# Patient Record
Sex: MALE | Race: WHITE | NOT HISPANIC OR LATINO | Employment: UNEMPLOYED | URBAN - METROPOLITAN AREA
[De-identification: names, ages, dates, MRNs, and addresses within clinical notes are randomized per-mention and may not be internally consistent; named-entity substitution may affect disease eponyms.]

---

## 2017-05-23 ENCOUNTER — HOSPITAL ENCOUNTER (OUTPATIENT)
Dept: PSYCHIATRY | Facility: HOSPITAL | Age: 47
Discharge: HOME | End: 2017-05-23

## 2017-05-23 PROCEDURE — 90791 PSYCH DIAGNOSTIC EVALUATION: CPT | Performed by: SOCIAL WORKER

## 2017-05-24 ENCOUNTER — OFFICE VISIT (OUTPATIENT)
Dept: PSYCHIATRY | Facility: HOSPITAL | Age: 47
End: 2017-05-24

## 2017-05-24 DIAGNOSIS — F10.20 ALCOHOL USE DISORDER, SEVERE, DEPENDENCE (HCC): Primary | ICD-10-CM

## 2017-05-30 ENCOUNTER — OFFICE VISIT (OUTPATIENT)
Dept: PSYCHIATRY | Facility: HOSPITAL | Age: 47
End: 2017-05-30

## 2017-05-30 DIAGNOSIS — F10.20 ALCOHOL USE DISORDER, SEVERE, DEPENDENCE (HCC): Primary | ICD-10-CM

## 2017-06-01 ENCOUNTER — OFFICE VISIT (OUTPATIENT)
Dept: PSYCHIATRY | Facility: HOSPITAL | Age: 47
End: 2017-06-01

## 2017-06-01 DIAGNOSIS — F10.20 ALCOHOL USE DISORDER, SEVERE, DEPENDENCE (HCC): Primary | ICD-10-CM

## 2017-06-01 NOTE — PROGRESS NOTES
CD IOP Group note  Teaching Record: 11:00am-12:00pm  Information / activity: Interactive       Good participation      Jose Reyes LCSW            Observations: 8:30am-10:00am    Engaged in Activity / Process and Self -disclosed: Yes  Applies Topic to self: Yes  Able to give Constructive Feedback: Yes  Affect: anxious  Degree of Insightful Thinking 5  Notes:  Pt actively participated in group process. Pt has difficulty taking responsibility of recovery. Pt struggles with identifying and managing feelings.       Jose Reyes LCSW

## 2017-06-01 NOTE — PROGRESS NOTES
Teaching Record:  Information / activity:  Expressive Therapy    Box Self Collage    Good participation      Mio Johnson, LPAT

## 2017-06-01 NOTE — PLAN OF CARE
Problem:  Patient Care Overview (Adult)  Goal: Interdisciplinary Rounds/Family Conference  Outcome: Ongoing (interventions implemented as appropriate)  Pt reported sober date: 4/4/17  Pt attended 3-5 weekly 12-step meetings  Obtaining phone numbers and reaching out to build sober support  Pt obtained sponsor  Compliant on medication. Pt reports decreasing medication through psychiatrist, Dr Palmer.   Stressors:  relationships  Pt has minimal motivation.     06/01/17 1340   Interdisciplinary Rounds/Family Conf   Participants social work;pastoral care

## 2017-06-02 ENCOUNTER — OFFICE VISIT (OUTPATIENT)
Dept: PSYCHIATRY | Facility: HOSPITAL | Age: 47
End: 2017-06-02

## 2017-06-02 DIAGNOSIS — F10.20 ALCOHOL USE DISORDER, SEVERE, DEPENDENCE (HCC): Primary | ICD-10-CM

## 2017-06-02 NOTE — PROGRESS NOTES
CD IOP Group note  Teaching Record: 11:00am-12:00pm  Information / activity: Matrix   Early Recovery Skills Workbook    Good participation      Jose Reyes LCSW          Teaching Record: 10:00am-11:00am  Information / activity: Lecture   Relapse Prevention     Good participation      Jose Reyes LCSW            Observations: 8:30am-10:00am    Engaged in Activity / Process and Self -disclosed: Yes  Applies Topic to self: Yes  Able to give Constructive Feedback: Yes  Affect: anxious  Degree of Insightful Thinking 6  Notes:  Pt actively participated in group process. Pt has difficulty focusing on here and now. Pt often uses unrealistic scenarios when discussing problems and seeking solutions.       Jose Reyes LCSW

## 2017-06-05 ENCOUNTER — OFFICE VISIT (OUTPATIENT)
Dept: PSYCHIATRY | Facility: HOSPITAL | Age: 47
End: 2017-06-05

## 2017-06-05 DIAGNOSIS — F10.20 ALCOHOL USE DISORDER, SEVERE, DEPENDENCE (HCC): Primary | ICD-10-CM

## 2017-06-05 NOTE — PROGRESS NOTES
CD IOP Group note  Observations: 8:30am-10:00am and 11:30am-12:00PM    Engaged in Activity / Process and Self -disclosed: Yes  Applies Topic to self: Yes  Able to give Constructive Feedback: Yes  Affect: anxious  Degree of Insightful Thinking 6  Notes:  Pt actively participated in group process. Pt able to identify essentials to recovery. Pt reports being able to have fun over the weekend which is a shift in perspective. Pt reports that he continues to monitor medication.       SANTA HerndonW

## 2017-06-05 NOTE — PROGRESS NOTES
Teaching Record:  Information / activity:  Spirituality  Kai Vargas and Vulnerability    Good participation      Laz Noriega

## 2017-06-06 ENCOUNTER — OFFICE VISIT (OUTPATIENT)
Dept: PSYCHIATRY | Facility: HOSPITAL | Age: 47
End: 2017-06-06

## 2017-06-06 DIAGNOSIS — F10.20 ALCOHOL USE DISORDER, SEVERE, DEPENDENCE (HCC): Primary | ICD-10-CM

## 2017-06-07 ENCOUNTER — OFFICE VISIT (OUTPATIENT)
Dept: PSYCHIATRY | Facility: HOSPITAL | Age: 47
End: 2017-06-07

## 2017-06-07 DIAGNOSIS — F10.20 ALCOHOL USE DISORDER, SEVERE, DEPENDENCE (HCC): Primary | ICD-10-CM

## 2017-06-07 NOTE — PROGRESS NOTES
CD IOP Group note  Teaching Record: 11:00am-12:00pm  Information / activity: Video   Addiction in the Family / Dulce Black    Good participation      Jose Reyes LCSW          Teaching Record: 10:00am-11:00am  Information / activity: Lecture   Addiction and the brain     Good participation      Jose Reyes LCSW            Observations: 8:30am-10:00am    Engaged in Activity / Process and Self -disclosed: Yes  Applies Topic to self: Yes  Able to give Constructive Feedback: Yes  Affect: flat  Degree of Insightful Thinking 6  Notes:  Pt actively participated in group process. Pt able to identify codependency in relationship with wife. Pt verbalized need for forgiveness.       Jose Reyes LCSW

## 2017-06-08 ENCOUNTER — OFFICE VISIT (OUTPATIENT)
Dept: PSYCHIATRY | Facility: HOSPITAL | Age: 47
End: 2017-06-08

## 2017-06-08 DIAGNOSIS — F10.20 ALCOHOL USE DISORDER, SEVERE, DEPENDENCE (HCC): Primary | ICD-10-CM

## 2017-06-08 NOTE — PROGRESS NOTES
Teaching Record:  Information / activity:  Expressive Therapy    Therapeutic story [employer] [place] [employer] [place] with watercolor     Good participation      Mio Johnson LPAT

## 2017-06-08 NOTE — PROGRESS NOTES
CD IOP Group note  Teaching Record: 11:00am-12:00pm  Information / activity: Interactive Activity       Good participation      Jose Reyes LCSW            Observations: 8:30am-10:00am    Engaged in Activity / Process and Self -disclosed: Yes  Applies Topic to self: Yes  Able to give Constructive Feedback: Yes  Affect: anxious  Degree of Insightful Thinking 6  Notes:  Pt activley participated in group process. Pt reports that sponsor wants him to read more. Pt able to identify need for healthy communication with spouse.       Jose Reyes LCSW

## 2017-06-08 NOTE — PLAN OF CARE
Problem: [facility] Patient Care Overview (Adult)  Goal: Interdisciplinary Rounds/Family Conference  Outcome: Ongoing (interventions implemented as appropriate)  Pt reported sober date: 4/9/17  Pt attended 3-5 weekly 12-step meetings  Obtaining phone numbers and reaching out to build sober support  Pt obtained sponsor  Compliant on medication (Adderall and Xanax) Pt reports continued decreased medication with psychiatrist consent.    Stressors: financial and relationships    06/08/17 1304   Interdisciplinary Rounds/Family Conf   Participants social work;pastoral care

## 2017-06-12 ENCOUNTER — OFFICE VISIT (OUTPATIENT)
Dept: PSYCHIATRY | Facility: HOSPITAL | Age: 47
End: 2017-06-12

## 2017-06-12 DIAGNOSIS — F10.20 ALCOHOL USE DISORDER, SEVERE, DEPENDENCE (HCC): Primary | ICD-10-CM

## 2017-06-12 LAB
AMPHET CTO UR CFM-MCNC: >4000 NG/ML
AMPHET+METHAMPHET UR QL: POSITIVE
AMPHETAMINES UR QL SCN: NORMAL NG/ML
AMPHETAMINES UR QL: POSITIVE
BARBITURATES UR QL SCN: NEGATIVE NG/ML
BENZODIAZ UR QL: NORMAL NG/ML
BENZODIAZ UR QL: POSITIVE
BZE UR QL: NEGATIVE NG/ML
CANNABINOIDS UR QL SCN: NEGATIVE NG/ML
CHAIN-OF-CUSTODY PROTOCOL: NORMAL
CREAT UR-MCNC: 106.7 MG/DL (ref 20–300)
ETHYL GLUCURONIDE UR QL: NEGATIVE NG/ML
METHADONE UR QL SCN: NEGATIVE NG/ML
METHAMPHET UR QL: NEGATIVE
NORDIAZEPAM UR QL CFM: NEGATIVE
OH-ALPRAZ UR CFM-MCNC: 661 NG/ML
OH-ALPRAZ UR QL CFM: POSITIVE
OPIATES UR QL SCN: NEGATIVE NG/ML
OXAZEPAM UR QL CFM: NEGATIVE
PCP UR QL: NEGATIVE NG/ML
PH UR: 5.7 [PH] (ref 4.5–8.9)
PROPOXYPH UR QL: NEGATIVE NG/ML

## 2017-06-12 NOTE — PROGRESS NOTES
Teaching Record:  Information / activity:  Spirituality             Transcending Negativity and Connecting with Hope    Moderate participation      Laz Noriega

## 2017-06-12 NOTE — PROGRESS NOTES
CD IOP Group note  Teaching Record: 10:00am-11:00am  Information / activity: Lecture   Shame     Good participation      Jose Reyes LCSW            Observations: 8:30am-10:00am    Engaged in Activity / Process and Self -disclosed: Yes  Applies Topic to self: Yes  Able to give Constructive Feedback: Yes  Affect: flat  Degree of Insightful Thinking 5  Notes:  Pt actively participated in group process. Pt reports increased depression over the weekend. Pt reports difficulty decreasing medication and reports consulting with psychiatrist. This clinician will follow-up with psychiatrist.       Jose Reyes LCSW

## 2017-06-13 ENCOUNTER — OFFICE VISIT (OUTPATIENT)
Dept: PSYCHIATRY | Facility: HOSPITAL | Age: 47
End: 2017-06-13

## 2017-06-13 DIAGNOSIS — F10.20 ALCOHOL USE DISORDER, SEVERE, DEPENDENCE (HCC): Primary | ICD-10-CM

## 2017-06-13 NOTE — PROGRESS NOTES
CD IOP Group note  Teaching Record: 11:00am-12:00pm  Information / activity: Matrix   Early Recovery Skills Workbook    Good participation      Jose Reyes LCSW          Teaching Record: 10:00am-11:00am  Information / activity:   [place] the [place]- AA Speaker    Good participation      Jose Reyes LCSW            Observations: 8:30am-10:00am    Engaged in Activity / Process and Self -disclosed: Yes  Applies Topic to self: Yes  Able to give Constructive Feedback: Yes  Affect: flat  Degree of Insightful Thinking 6  Notes:  Pt actively participated in group process. Pt completed assignment on [employer] Helplessness and able to recall personal learned helplessness. Pt reports that wife will attend family day. Clinician contacted pt psychiatrist to discuss medication management.       Jose Reyes LCSW

## 2017-06-14 ENCOUNTER — OFFICE VISIT (OUTPATIENT)
Dept: PSYCHIATRY | Facility: HOSPITAL | Age: 47
End: 2017-06-14

## 2017-06-14 DIAGNOSIS — F10.20 ALCOHOL USE DISORDER, SEVERE, DEPENDENCE (HCC): Primary | ICD-10-CM

## 2017-06-14 NOTE — PLAN OF CARE
Problem:  Patient Care Overview (Adult)  Goal: Interdisciplinary Rounds/Family Conference  Outcome: Ongoing (interventions implemented as appropriate)  Pt reported sober date: 4/9/17  Pt attended 3-5 weekly 12-step meetings  Obtaining phone numbers and reaching out to build sober support  Pt obtained sponsor  Pt not currently completing step work with sponsor.   Pt wife attended family day.  Compliant on medication (Adderall and Xanax) Pt reports continued decreased medication with psychiatrist consent. Pt currently taking 45mg Adderall and 4mg Xanax daily. Pt psychiatrist contacted on 6/13/17.   Stressors: financial and relationships    06/14/17 1344   Interdisciplinary Rounds/Family Conf   Participants social work;pastoral care

## 2017-06-14 NOTE — PROGRESS NOTES
CD IOP Group note  Teaching Record: 11:00am-12:00pm  Information / activity: Lecture   Family Roles of Addiction     Good participation      Jose Reyes LCSW          Teaching Record: 10:00am-11:00am  Information / activity: Al-Anon Speaker       Good participation      Jose Reyes LCSW            Observations: 8:30am-10:00am    Engaged in Activity / Process and Self -disclosed: Yes  Applies Topic to self: Yes  Able to give Constructive Feedback: Yes  Affect: anxious  Degree of Insightful Thinking 6  Notes:  Pt actively participated in group process. Pt wife attended family day. Pt reported regrets in relationship with wife.       Jose Reyes LCSW

## 2017-06-15 ENCOUNTER — OFFICE VISIT (OUTPATIENT)
Dept: PSYCHIATRY | Facility: HOSPITAL | Age: 47
End: 2017-06-15

## 2017-06-15 DIAGNOSIS — F10.20 ALCOHOL USE DISORDER, SEVERE, DEPENDENCE (HCC): Primary | ICD-10-CM

## 2017-06-15 NOTE — PROGRESS NOTES
CD IOP Group note  Teaching Record: 11:00am-12:00pm  Information / activity: Matrix   Early Recovery Skills Workbook    Good participation      Jose Reyes LCSW            Observations: 8:30am-10:00am    Engaged in Activity / Process and Self -disclosed: Yes  Applies Topic to self: Yes  Able to give Constructive Feedback: Yes  Affect: flat  Degree of Insightful Thinking 5  Notes:  Pt actively participated in group process. Pt reported that wife was able to identify benefits from family day. Pt reports need to get more involved and some of this behavior is driven from shame base. Pt reported learned helplessness and impact it has on employment and marriage.       Jose Reyes LCSW

## 2017-06-17 NOTE — PROGRESS NOTES
Teaching Record:  Information / activity:  Expressive Therapy    Collage of addiction and recovery    Good participation      Mio Johnson, LPAT

## 2017-06-19 ENCOUNTER — OFFICE VISIT (OUTPATIENT)
Dept: PSYCHIATRY | Facility: HOSPITAL | Age: 47
End: 2017-06-19

## 2017-06-19 DIAGNOSIS — F10.20 ALCOHOL USE DISORDER, SEVERE, DEPENDENCE (HCC): Primary | ICD-10-CM

## 2017-06-19 NOTE — PROGRESS NOTES
CD IOP Group note  Teaching Record: 10:00am-11:00am  Information / activity: Lecture   Treatment Process    Good participation      Jose Reyes LCSW            Observations: 8:30am-10:00am    Engaged in Activity / Process and Self -disclosed: Yes  Applies Topic to self: Yes  Able to give Constructive Feedback: Yes  Affect: flat   Degree of Insightful Thinking 6  Notes:  Pt actively participated in group process. Pt reports difficulty interacting with spouse and that he feels guilty often during interaction.       Jose Reyes LCSW

## 2017-06-19 NOTE — PROGRESS NOTES
Teaching Record:  Information / activity:  Spirituality      The Spiritual Power of Reminiscence and Self-knowledge     Good participation      Laz Noriega

## 2017-06-21 ENCOUNTER — OFFICE VISIT (OUTPATIENT)
Dept: PSYCHIATRY | Facility: HOSPITAL | Age: 47
End: 2017-06-21

## 2017-06-21 DIAGNOSIS — F10.20 ALCOHOL USE DISORDER, SEVERE, DEPENDENCE (HCC): Primary | ICD-10-CM

## 2017-06-21 NOTE — PROGRESS NOTES
CD IOP Group note  Teaching Record: 11:00am-12:00pm   Information / activity: Lecture   Relapse Prevention     Good participation      Jose Reyes LCSW          Teaching Record: 10:00am-11:00am  Information / activity: Video    Pleasure Unwoven     Good participation      Jose Reyes LCSW            Observations: 8:30am-10:00am    Engaged in Activity / Process and Self -disclosed: Yes  Applies Topic to self: Yes  Able to give Constructive Feedback: Yes  Affect: flat  Degree of Insightful Thinking 6  Notes:  Pt actively participated in group process. Pt wife attended family day. Pt able to identify post-acute withdrawal symptoms.       Jose Reyes LCSW

## 2017-06-21 NOTE — PLAN OF CARE
Problem:  Patient Care Overview (Adult)  Goal: Interdisciplinary Rounds/Family Conference  Outcome: Ongoing (interventions implemented as appropriate)  Pt reported sober date: 4/9/17  Pt attended 3-5 weekly 12-step meetings  Obtaining phone numbers and reaching out to build sober support  Pt obtained sponsor  Pt not currently completing step work with sponsor.   Pt wife attended family day.  Compliant on medication (Adderall and Xanax) Pt reports continued decreased medication with psychiatrist consent. Pt currently taking 45mg Adderall and 4mg Xanax daily.Pt has appointment with psychiatrist this week.   Stressors: financial and relationships  Pt continues to report codependent relationship with wife.     06/21/17 1322   Interdisciplinary Rounds/Family Conf   Participants social work;pastoral care

## 2017-06-22 ENCOUNTER — OFFICE VISIT (OUTPATIENT)
Dept: PSYCHIATRY | Facility: HOSPITAL | Age: 47
End: 2017-06-22

## 2017-06-22 DIAGNOSIS — F10.20 ALCOHOL USE DISORDER, SEVERE, DEPENDENCE (HCC): Primary | ICD-10-CM

## 2017-06-22 NOTE — PROGRESS NOTES
"Teaching Record:  Information / activity:  Expressive Therapy    Externalization of addiction \"creature\" and dialogue    Good participation      Mio Johnson, LPAT            "

## 2017-06-22 NOTE — PROGRESS NOTES
CD IOP Group note  Teaching Record: 11:00am-12:00pm  Information / activity: Meditation       Good participation      Jose Reyes LCSW            Observations: 8:30am-10:00am    Engaged in Activity / Process and Self -disclosed: Yes  Applies Topic to self: Yes  Able to give Constructive Feedback: Yes  Affect: flat  Degree of Insightful Thinking 6  Notes:  Pt actively participated in group process. Pt reported that wife was able to identify benefit from family day. Pt reported plans fr weekend.       Jose Reyes LCSW

## 2017-06-26 ENCOUNTER — OFFICE VISIT (OUTPATIENT)
Dept: PSYCHIATRY | Facility: HOSPITAL | Age: 47
End: 2017-06-26

## 2017-06-26 DIAGNOSIS — F10.20 ALCOHOL USE DISORDER, SEVERE, DEPENDENCE (HCC): Primary | ICD-10-CM

## 2017-06-26 NOTE — PROGRESS NOTES
Teaching Record:  Information / activity:  Spirituality    Moderate participation      Laz Noriega

## 2017-06-26 NOTE — PROGRESS NOTES
CD IOP Group note  Teaching Record: 10:00am-11:00am  Information / activity: Matrix Workbook  Early Recovery Skills Workbook    Good participation      Jose Reyes LCSW            Observations: 8:30am-10:00am    Engaged in Activity / Process and Self -disclosed: Yes  Applies Topic to self: Yes  Able to give Constructive Feedback: Yes  Affect: flat  Degree of Insightful Thinking 5  Notes:  Pt actively participated in group process. Pt reported difficulty managing medication over the weekend and was overwhelmed and abused Adderall. Pt continues to lack adequate coping skills.       Jose Reyes LCSW

## 2017-06-27 ENCOUNTER — OFFICE VISIT (OUTPATIENT)
Dept: PSYCHIATRY | Facility: HOSPITAL | Age: 47
End: 2017-06-27

## 2017-06-27 DIAGNOSIS — F10.20 ALCOHOL USE DISORDER, SEVERE, DEPENDENCE (HCC): Primary | ICD-10-CM

## 2017-06-27 NOTE — PROGRESS NOTES
CD IOP Group note  Teaching Record: 11:00am-12:00pm  Information / activity: Matrix Workbook  Early Recovery Skills Workbook    Good participation      Jose Reyes LCSW          Teaching Record: 10:00am-11:00am  Information / activity: Video   Johnathan WILKS  Documentary    Good participation      Jose Reyes LCSW            Observations: 8:30am-10:00am    Engaged in Activity / Process and Self -disclosed: Yes  Applies Topic to self: Yes  Able to give Constructive Feedback: Yes  Affect: angry  Degree of Insightful Thinking 6  Notes:  Pt actively participated in group process. Pt reported that PCP determined that he was prediabetic. Pt reports being overwhelmed and using Adderall again to refocus.        Jose Reyes LCSW

## 2017-06-28 ENCOUNTER — OFFICE VISIT (OUTPATIENT)
Dept: PSYCHIATRY | Facility: HOSPITAL | Age: 47
End: 2017-06-28

## 2017-06-28 DIAGNOSIS — F10.20 ALCOHOL USE DISORDER, SEVERE, DEPENDENCE (HCC): Primary | ICD-10-CM

## 2017-06-28 NOTE — PROGRESS NOTES
"CD IOP Group note  Teaching Record: 11:00am-12:00pm  Information / activity: Lecture   Enabling     Good participation      Jose Reyes LCSW          Teaching Record: 10:00am-11:00am  Information / activity: Presentation   Neuroscience of addiction     Good participation      Jose Reyes LCSW            Observations: 8:30am-10:00am    Engaged in Activity / Process and Self -disclosed: Yes  Applies Topic to self: Yes  Able to give Constructive Feedback: Yes  Affect: anxious  Degree of Insightful Thinking 5  Notes:  Pt actively participated in group process. Pt wife attended family day. Pt able to identify \"shortcomings\" in relationships and how perfectionism impacts intimacy.       Jose Reyes LCSW            "

## 2017-06-29 ENCOUNTER — OFFICE VISIT (OUTPATIENT)
Dept: PSYCHIATRY | Facility: HOSPITAL | Age: 47
End: 2017-06-29

## 2017-06-29 DIAGNOSIS — F10.20 ALCOHOL USE DISORDER, SEVERE, DEPENDENCE (HCC): Primary | ICD-10-CM

## 2017-06-29 NOTE — PROGRESS NOTES
Teaching Record:  Information / activity:  Expressive Therapy    [facility] Water Color art therapy    Good participation      Mio Johnson, LPAT

## 2017-06-29 NOTE — PROGRESS NOTES
CD IOP Group note  Teaching Record: 11:00am-12:00pm  Information / activity: Lecture   Stages of Change     Good participation      Jose Reyes LCSW            Observations: 8:30am-10:00am    Engaged in Activity / Process and Self -disclosed: Yes  Applies Topic to self: Yes  Able to give Constructive Feedback: Yes  Affect: anxious  Degree of Insightful Thinking 6  Notes:  Pt actively participated in group process. Pt able to identify communication barriers with spouse. Pt reported that he had mild craving for alcohol last night.       Jose Reyes LCSW

## 2017-06-29 NOTE — PLAN OF CARE
Problem:  Patient Care Overview (Adult)  Goal: Interdisciplinary Rounds/Family Conference  Outcome: Ongoing (interventions implemented as appropriate)  Pt reported sober date: 4/9/17  Pt attended 3-5 weekly 12-step meetings  Obtaining phone numbers and reaching out to build sober support  Pt obtained sponsor  Pt not currently completing step work with sponsor.   Pt wife attended family day.  Compliant on medication. Pt psychiatrist discontinued Adderall. Pt reports increased distractability and lack of concentration. Pt reports feeling frustration and using Adderall as coping skills. Pt continues to lack adequate coping skills.   Stressors: financial and relationships  Pt continues to report codependent relationship with wife.     06/29/17 1036   Interdisciplinary Rounds/Family Conf   Participants social work;pastoral care